# Patient Record
Sex: MALE | Race: WHITE | NOT HISPANIC OR LATINO | Employment: OTHER | URBAN - METROPOLITAN AREA
[De-identification: names, ages, dates, MRNs, and addresses within clinical notes are randomized per-mention and may not be internally consistent; named-entity substitution may affect disease eponyms.]

---

## 2018-01-01 ENCOUNTER — APPOINTMENT (OUTPATIENT)
Dept: CT IMAGING | Facility: HOSPITAL | Age: 83
End: 2018-01-01

## 2018-01-01 ENCOUNTER — APPOINTMENT (OUTPATIENT)
Dept: GENERAL RADIOLOGY | Facility: HOSPITAL | Age: 83
End: 2018-01-01

## 2018-01-01 ENCOUNTER — HOSPITAL ENCOUNTER (INPATIENT)
Facility: HOSPITAL | Age: 83
LOS: 1 days | End: 2018-12-08
Attending: EMERGENCY MEDICINE | Admitting: INTERNAL MEDICINE

## 2018-01-01 VITALS
TEMPERATURE: 94.3 F | SYSTOLIC BLOOD PRESSURE: 107 MMHG | HEIGHT: 70 IN | DIASTOLIC BLOOD PRESSURE: 50 MMHG | OXYGEN SATURATION: 22 % | WEIGHT: 146 LBS | BODY MASS INDEX: 20.9 KG/M2 | RESPIRATION RATE: 24 BRPM

## 2018-01-01 DIAGNOSIS — S06.5XAA SDH (SUBDURAL HEMATOMA) (HCC): Primary | ICD-10-CM

## 2018-01-01 DIAGNOSIS — R40.2412 GLASGOW COMA SCALE TOTAL SCORE 13-15, AT ARRIVAL TO EMERGENCY DEPARTMENT: ICD-10-CM

## 2018-01-01 DIAGNOSIS — I60.9 SAH (SUBARACHNOID HEMORRHAGE) (HCC): ICD-10-CM

## 2018-01-01 DIAGNOSIS — G93.5 BRAIN HERNIATION (HCC): ICD-10-CM

## 2018-01-01 DIAGNOSIS — S05.12XA PERIORBITAL CONTUSION OF LEFT EYE, INITIAL ENCOUNTER: ICD-10-CM

## 2018-01-01 DIAGNOSIS — S06.310A: ICD-10-CM

## 2018-01-01 LAB
ALBUMIN SERPL-MCNC: 3.95 G/DL (ref 3.2–4.8)
ALBUMIN SERPL-MCNC: 4.1 G/DL (ref 3.2–4.8)
ALBUMIN/GLOB SERPL: 1.4 G/DL (ref 1.5–2.5)
ALBUMIN/GLOB SERPL: 1.4 G/DL (ref 1.5–2.5)
ALP SERPL-CCNC: 92 U/L (ref 25–100)
ALP SERPL-CCNC: 96 U/L (ref 25–100)
ALT SERPL W P-5'-P-CCNC: 16 U/L (ref 7–40)
ALT SERPL W P-5'-P-CCNC: 18 U/L (ref 7–40)
ANION GAP SERPL CALCULATED.3IONS-SCNC: 10 MMOL/L (ref 3–11)
ANION GAP SERPL CALCULATED.3IONS-SCNC: 11 MMOL/L (ref 3–11)
ARTERIAL PATENCY WRIST A: POSITIVE
ARTICHOKE IGE QN: 49 MG/DL (ref 0–130)
AST SERPL-CCNC: 32 U/L (ref 0–33)
AST SERPL-CCNC: 34 U/L (ref 0–33)
ATMOSPHERIC PRESS: ABNORMAL MMHG
BASE EXCESS BLDA CALC-SCNC: -0.1 MMOL/L (ref 0–2)
BASOPHILS # BLD AUTO: 0.01 10*3/MM3 (ref 0–0.2)
BASOPHILS NFR BLD AUTO: 0.1 % (ref 0–1)
BDY SITE: ABNORMAL
BILIRUB SERPL-MCNC: 1.7 MG/DL (ref 0.3–1.2)
BILIRUB SERPL-MCNC: 1.8 MG/DL (ref 0.3–1.2)
BODY TEMPERATURE: 37 C
BUN BLD-MCNC: 18 MG/DL (ref 9–23)
BUN BLD-MCNC: 19 MG/DL (ref 9–23)
BUN/CREAT SERPL: 19.4 (ref 7–25)
BUN/CREAT SERPL: 20 (ref 7–25)
CALCIUM SPEC-SCNC: 9 MG/DL (ref 8.7–10.4)
CALCIUM SPEC-SCNC: 9.2 MG/DL (ref 8.7–10.4)
CHLORIDE SERPL-SCNC: 105 MMOL/L (ref 99–109)
CHLORIDE SERPL-SCNC: 106 MMOL/L (ref 99–109)
CHOLEST SERPL-MCNC: 99 MG/DL (ref 0–200)
CO2 SERPL-SCNC: 22 MMOL/L (ref 20–31)
CO2 SERPL-SCNC: 25 MMOL/L (ref 20–31)
COHGB MFR BLD: 1.4 % (ref 0–2)
CREAT BLD-MCNC: 0.9 MG/DL (ref 0.6–1.3)
CREAT BLD-MCNC: 0.98 MG/DL (ref 0.6–1.3)
DEPRECATED RDW RBC AUTO: 50.1 FL (ref 37–54)
DEPRECATED RDW RBC AUTO: 50.9 FL (ref 37–54)
EOSINOPHIL # BLD AUTO: 0.04 10*3/MM3 (ref 0–0.3)
EOSINOPHIL NFR BLD AUTO: 0.4 % (ref 0–3)
ERYTHROCYTE [DISTWIDTH] IN BLOOD BY AUTOMATED COUNT: 13.5 % (ref 11.3–14.5)
ERYTHROCYTE [DISTWIDTH] IN BLOOD BY AUTOMATED COUNT: 13.6 % (ref 11.3–14.5)
GFR SERPL CREATININE-BSD FRML MDRD: 72 ML/MIN/1.73
GFR SERPL CREATININE-BSD FRML MDRD: 79 ML/MIN/1.73
GLOBULIN UR ELPH-MCNC: 2.8 GM/DL
GLOBULIN UR ELPH-MCNC: 2.9 GM/DL
GLUCOSE BLD-MCNC: 166 MG/DL (ref 70–100)
GLUCOSE BLD-MCNC: 170 MG/DL (ref 70–100)
HBA1C MFR BLD: 6 % (ref 4.8–5.6)
HCO3 BLDA-SCNC: 24 MMOL/L (ref 20–26)
HCT VFR BLD AUTO: 33.5 % (ref 38.9–50.9)
HCT VFR BLD AUTO: 35.8 % (ref 38.9–50.9)
HCT VFR BLD CALC: 34.3 %
HDLC SERPL-MCNC: 31 MG/DL (ref 40–60)
HGB BLD-MCNC: 11 G/DL (ref 13.1–17.5)
HGB BLD-MCNC: 11.3 G/DL (ref 13.1–17.5)
HGB BLDA-MCNC: 11.2 G/DL (ref 13.5–17.5)
HOROWITZ INDEX BLD+IHG-RTO: 40 %
IMM GRANULOCYTES # BLD: 0.03 10*3/MM3 (ref 0–0.03)
IMM GRANULOCYTES NFR BLD: 0.3 % (ref 0–0.6)
INR PPP: 1.31 (ref 0.91–1.09)
INR PPP: 3.46 (ref 0.91–1.09)
LYMPHOCYTES # BLD AUTO: 2.06 10*3/MM3 (ref 0.6–4.8)
LYMPHOCYTES NFR BLD AUTO: 21.7 % (ref 24–44)
MAGNESIUM SERPL-MCNC: 2 MG/DL (ref 1.3–2.7)
MCH RBC QN AUTO: 32.6 PG (ref 27–31)
MCH RBC QN AUTO: 33.5 PG (ref 27–31)
MCHC RBC AUTO-ENTMCNC: 31.6 G/DL (ref 32–36)
MCHC RBC AUTO-ENTMCNC: 32.8 G/DL (ref 32–36)
MCV RBC AUTO: 102.1 FL (ref 80–99)
MCV RBC AUTO: 103.2 FL (ref 80–99)
METHGB BLD QL: 0.7 % (ref 0–1.5)
MODALITY: ABNORMAL
MONOCYTES # BLD AUTO: 0.54 10*3/MM3 (ref 0–1)
MONOCYTES NFR BLD AUTO: 5.7 % (ref 0–12)
NEUTROPHILS # BLD AUTO: 6.85 10*3/MM3 (ref 1.5–8.3)
NEUTROPHILS NFR BLD AUTO: 72.1 % (ref 41–71)
NOTE: ABNORMAL
OXYHGB MFR BLDV: 97.4 % (ref 94–99)
PCO2 BLDA: 36.1 MM HG
PCO2 TEMP ADJ BLD: 36.1 MM HG (ref 35–48)
PEEP RESPIRATORY: 5 CM[H2O]
PH BLDA: 7.43 PH UNITS (ref 7.35–7.45)
PH, TEMP CORRECTED: 7.43 PH UNITS
PLATELET # BLD AUTO: 183 10*3/MM3 (ref 150–450)
PLATELET # BLD AUTO: 191 10*3/MM3 (ref 150–450)
PMV BLD AUTO: 9.4 FL (ref 6–12)
PMV BLD AUTO: 9.6 FL (ref 6–12)
PO2 BLDA: 132 MM HG (ref 83–108)
PO2 TEMP ADJ BLD: 132 MM HG (ref 83–108)
POTASSIUM BLD-SCNC: 3.2 MMOL/L (ref 3.5–5.5)
POTASSIUM BLD-SCNC: 3.4 MMOL/L (ref 3.5–5.5)
PROT SERPL-MCNC: 6.7 G/DL (ref 5.7–8.2)
PROT SERPL-MCNC: 7 G/DL (ref 5.7–8.2)
PROTHROMBIN TIME: 13.8 SECONDS (ref 9.6–11.5)
PROTHROMBIN TIME: 36.3 SECONDS (ref 9.6–11.5)
RBC # BLD AUTO: 3.28 10*6/MM3 (ref 4.2–5.76)
RBC # BLD AUTO: 3.47 10*6/MM3 (ref 4.2–5.76)
SET MECH RESP RATE: 14
SODIUM BLD-SCNC: 139 MMOL/L (ref 132–146)
SODIUM BLD-SCNC: 140 MMOL/L (ref 132–146)
TOTAL RATE: 14 BREATHS/MINUTE
TRIGL SERPL-MCNC: 64 MG/DL (ref 0–150)
TROPONIN I SERPL-MCNC: 0 NG/ML (ref 0–0.07)
VT ON VENT VENT: 520 ML
WBC NRBC COR # BLD: 15.35 10*3/MM3 (ref 3.5–10.8)
WBC NRBC COR # BLD: 9.5 10*3/MM3 (ref 3.5–10.8)

## 2018-01-01 PROCEDURE — 99291 CRITICAL CARE FIRST HOUR: CPT | Performed by: INTERNAL MEDICINE

## 2018-01-01 PROCEDURE — 85610 PROTHROMBIN TIME: CPT | Performed by: INTERNAL MEDICINE

## 2018-01-01 PROCEDURE — 70450 CT HEAD/BRAIN W/O DYE: CPT

## 2018-01-01 PROCEDURE — 83735 ASSAY OF MAGNESIUM: CPT | Performed by: EMERGENCY MEDICINE

## 2018-01-01 PROCEDURE — 80053 COMPREHEN METABOLIC PANEL: CPT | Performed by: EMERGENCY MEDICINE

## 2018-01-01 PROCEDURE — 85610 PROTHROMBIN TIME: CPT | Performed by: EMERGENCY MEDICINE

## 2018-01-01 PROCEDURE — 94799 UNLISTED PULMONARY SVC/PX: CPT

## 2018-01-01 PROCEDURE — 99285 EMERGENCY DEPT VISIT HI MDM: CPT

## 2018-01-01 PROCEDURE — 36600 WITHDRAWAL OF ARTERIAL BLOOD: CPT

## 2018-01-01 PROCEDURE — 84484 ASSAY OF TROPONIN QUANT: CPT

## 2018-01-01 PROCEDURE — 25010000002 VITAMIN K1 PER 1 MG: Performed by: INTERNAL MEDICINE

## 2018-01-01 PROCEDURE — 25010000002 PROMETHAZINE PER 50 MG: Performed by: EMERGENCY MEDICINE

## 2018-01-01 PROCEDURE — 93005 ELECTROCARDIOGRAM TRACING: CPT | Performed by: EMERGENCY MEDICINE

## 2018-01-01 PROCEDURE — 83036 HEMOGLOBIN GLYCOSYLATED A1C: CPT | Performed by: INTERNAL MEDICINE

## 2018-01-01 PROCEDURE — 80053 COMPREHEN METABOLIC PANEL: CPT | Performed by: INTERNAL MEDICINE

## 2018-01-01 PROCEDURE — 71045 X-RAY EXAM CHEST 1 VIEW: CPT

## 2018-01-01 PROCEDURE — 31500 INSERT EMERGENCY AIRWAY: CPT

## 2018-01-01 PROCEDURE — 25010000002 PROTHROMBIN COMPLEX CONC HUMAN 1000 UNITS KIT: Performed by: INTERNAL MEDICINE

## 2018-01-01 PROCEDURE — 82805 BLOOD GASES W/O2 SATURATION: CPT

## 2018-01-01 PROCEDURE — 25010000002 PROPOFOL 1000 MG/ML EMULSION: Performed by: EMERGENCY MEDICINE

## 2018-01-01 PROCEDURE — 5A1935Z RESPIRATORY VENTILATION, LESS THAN 24 CONSECUTIVE HOURS: ICD-10-PCS | Performed by: INTERNAL MEDICINE

## 2018-01-01 PROCEDURE — 80061 LIPID PANEL: CPT | Performed by: INTERNAL MEDICINE

## 2018-01-01 PROCEDURE — 70486 CT MAXILLOFACIAL W/O DYE: CPT

## 2018-01-01 PROCEDURE — 85025 COMPLETE CBC W/AUTO DIFF WBC: CPT | Performed by: EMERGENCY MEDICINE

## 2018-01-01 PROCEDURE — 85027 COMPLETE CBC AUTOMATED: CPT | Performed by: INTERNAL MEDICINE

## 2018-01-01 PROCEDURE — 0BH17EZ INSERTION OF ENDOTRACHEAL AIRWAY INTO TRACHEA, VIA NATURAL OR ARTIFICIAL OPENING: ICD-10-PCS | Performed by: EMERGENCY MEDICINE

## 2018-01-01 PROCEDURE — C9132 KCENTRA, PER I.U.: HCPCS | Performed by: INTERNAL MEDICINE

## 2018-01-01 PROCEDURE — 94002 VENT MGMT INPAT INIT DAY: CPT

## 2018-01-01 PROCEDURE — 72125 CT NECK SPINE W/O DYE: CPT

## 2018-01-01 RX ORDER — WARFARIN SODIUM 2.5 MG/1
5 TABLET ORAL NIGHTLY
COMMUNITY

## 2018-01-01 RX ORDER — ERGOCALCIFEROL (VITAMIN D2) 10 MCG
400 TABLET ORAL DAILY
COMMUNITY

## 2018-01-01 RX ORDER — SODIUM CHLORIDE 9 MG/ML
50 INJECTION, SOLUTION INTRAVENOUS CONTINUOUS
Status: DISCONTINUED | OUTPATIENT
Start: 2018-01-01 | End: 2018-01-01

## 2018-01-01 RX ORDER — ETOMIDATE 2 MG/ML
0.3 INJECTION INTRAVENOUS ONCE
Status: COMPLETED | OUTPATIENT
Start: 2018-01-01 | End: 2018-01-01

## 2018-01-01 RX ORDER — FAMOTIDINE 10 MG/ML
20 INJECTION, SOLUTION INTRAVENOUS DAILY
Status: DISCONTINUED | OUTPATIENT
Start: 2018-01-01 | End: 2018-01-01

## 2018-01-01 RX ORDER — CHLORHEXIDINE GLUCONATE 0.12 MG/ML
15 RINSE ORAL EVERY 12 HOURS SCHEDULED
Status: DISCONTINUED | OUTPATIENT
Start: 2018-01-01 | End: 2018-01-01 | Stop reason: HOSPADM

## 2018-01-01 RX ORDER — PHENYLEPHRINE HCL IN 0.9% NACL 0.5 MG/5ML
.5-3 SYRINGE (ML) INTRAVENOUS
Status: DISCONTINUED | OUTPATIENT
Start: 2018-01-01 | End: 2018-01-01

## 2018-01-01 RX ORDER — LANOLIN ALCOHOL/MO/W.PET/CERES
1000 CREAM (GRAM) TOPICAL DAILY
COMMUNITY

## 2018-01-01 RX ORDER — MORPHINE SULFATE 1 MG/ML
2-30 INJECTION INTRAVENOUS
Status: DISCONTINUED | OUTPATIENT
Start: 2018-01-01 | End: 2018-01-01 | Stop reason: HOSPADM

## 2018-01-01 RX ORDER — PROMETHAZINE HYDROCHLORIDE 25 MG/ML
12.5 INJECTION, SOLUTION INTRAMUSCULAR; INTRAVENOUS ONCE
Status: COMPLETED | OUTPATIENT
Start: 2018-01-01 | End: 2018-01-01

## 2018-01-01 RX ORDER — ATENOLOL 25 MG/1
25 TABLET ORAL 2 TIMES DAILY
COMMUNITY

## 2018-01-01 RX ORDER — ATORVASTATIN CALCIUM 10 MG/1
10 TABLET, FILM COATED ORAL NIGHTLY
COMMUNITY

## 2018-01-01 RX ORDER — LORAZEPAM 2 MG/ML
1 INJECTION INTRAMUSCULAR EVERY 6 HOURS PRN
Status: DISCONTINUED | OUTPATIENT
Start: 2018-01-01 | End: 2018-01-01 | Stop reason: HOSPADM

## 2018-01-01 RX ORDER — SODIUM CHLORIDE 0.9 % (FLUSH) 0.9 %
3-10 SYRINGE (ML) INJECTION AS NEEDED
Status: DISCONTINUED | OUTPATIENT
Start: 2018-01-01 | End: 2018-01-01

## 2018-01-01 RX ORDER — CITALOPRAM 10 MG/1
10 TABLET ORAL NIGHTLY
COMMUNITY

## 2018-01-01 RX ORDER — ROCURONIUM BROMIDE 10 MG/ML
100 INJECTION, SOLUTION INTRAVENOUS ONCE
Status: COMPLETED | OUTPATIENT
Start: 2018-01-01 | End: 2018-01-01

## 2018-01-01 RX ORDER — SODIUM CHLORIDE 0.9 % (FLUSH) 0.9 %
3 SYRINGE (ML) INJECTION EVERY 12 HOURS SCHEDULED
Status: DISCONTINUED | OUTPATIENT
Start: 2018-01-01 | End: 2018-01-01

## 2018-01-01 RX ADMIN — PROTHROMBIN, COAGULATION FACTOR VII HUMAN, COAGULATION FACTOR IX HUMAN, COAGULATION FACTOR X HUMAN, PROTEIN C, PROTEIN S HUMAN, AND WATER 2000 UNITS: KIT at 02:06

## 2018-01-01 RX ADMIN — PROPOFOL 5 MCG/KG/MIN: 10 INJECTION, EMULSION INTRAVENOUS at 02:09

## 2018-01-01 RX ADMIN — PHYTONADIONE 10 MG: 10 INJECTION, EMULSION INTRAMUSCULAR; INTRAVENOUS; SUBCUTANEOUS at 02:18

## 2018-01-01 RX ADMIN — PROMETHAZINE HYDROCHLORIDE 12.5 MG: 25 INJECTION INTRAMUSCULAR; INTRAVENOUS at 01:31

## 2018-01-01 RX ADMIN — ROCURONIUM BROMIDE 100 MG: 10 INJECTION INTRAVENOUS at 01:59

## 2018-01-01 RX ADMIN — Medication 10 ML: at 03:23

## 2018-01-01 RX ADMIN — ETOMIDATE 20 MG: 2 INJECTION INTRAVENOUS at 01:59

## 2018-12-08 PROBLEM — I48.91 ATRIAL FIBRILLATION (HCC): Status: ACTIVE | Noted: 2018-01-01

## 2018-12-08 PROBLEM — S06.5XAA SDH (SUBDURAL HEMATOMA) (HCC): Status: ACTIVE | Noted: 2018-01-01

## 2018-12-08 PROBLEM — Z79.01 CHRONIC ANTICOAGULATION: Status: ACTIVE | Noted: 2018-01-01

## 2018-12-08 PROBLEM — S06.2X0A CONTUSION OF BRAIN WITHOUT LOSS OF CONSCIOUSNESS (HCC): Status: ACTIVE | Noted: 2018-01-01

## 2018-12-08 NOTE — SIGNIFICANT NOTE
Exam confirms with auscultation zero audible heart tones and zero audible respirations. Mr.William DRE Hunter was pronounced dead at 0426 12/8/18.  MD notified by Patient's RN.    Mahesh Fernando RN  Clinical House Supervisor  12/8/2018 4:41 AM

## 2018-12-08 NOTE — DISCHARGE SUMMARY
Death Summary         Patient Name: Angel Hunter    CSN: [id number]    MRN: [id number]    : 1926    Today's Date: 18    Date of Admission: 2018    Date/Time of Death: 2018 at 0426    Admitting Physician:  Jimena Garcia MD    Primary Care Provider: Moe Wolf MD    Consultations:   Jose Juan Spann MD, Neurosurgery      Admission Diagnosis: Subdural hematoma    Diagnoses at Time of Death:     SDH (subdural hematoma) (CMS/HCC)    Contusion of brain without loss of consciousness (CMS/HCC)    Chronic anticoagulation    Atrial fibrillation (CMS/HCC)        Procedures: None           HPI     History of Present Illness:    Patient is a 89 y.o. male with a history of atrial fibrillation on Coumadin who fell getting out of the car at 11 PM. He landed on his face. He got up and went to his apartment. His daughter came over and they called an ambulance and he was brought to the hospital. In the emergency room initially he was completely oriented without any focal neurologic deficits. [place] Coma Scale was 15. He had bruising on his face. CT scan revealed a 14 mm subdural hematoma, left frontal lobe hemorrhagic contusion with subarachnoid hemorrhage that was small. There was 8 mm right to left midline shift. Emergency room physician Dr. Wolff spoke with neurosurgery Dr. Spann who asked him to call us for to admit. Upon my arrival the patient had started vomiting and had become unresponsive and was posturing. They were preparing to intubate. Nursing was suctioning him and he did have a gag reflex. His pupils were small and equal. Laboratory data was still pending, however, I asked them to give Kcentra and vitamin K urgently. Subsequently, pro time INR returned at 3.46. Family reports that the patient was completely independent living in an independent senior apartment.          Hospital Course       Hospital Course:    Repeat CT scan of the head showed increase size of  subdural hemorrhage to 32 mm and 24 mm midline shift with subfalcine herniation. He was brought to the ICU and discussions were held with the family who made the decision to withdraw care and make the patient comfortable.  was called for family support and the patient was pronounced  at 0426 on 2018.         KANU Barboza  Pulmonary & Critical Care Medicine                    *Please note that portions of this note were completed with a voice recognition program. Efforts were made to edit the dictations, but occasionally words are mistranscribed.

## 2018-12-08 NOTE — SIGNIFICANT NOTE
After all family members arrived, I spoke with them again and discussed the grave nature of the brain injury. The family has chosen to make the patient comfort measures and withdraw care. He was extubated at 0400. The  was called to be with the family.     KANU Barboza, ACNP-BC  Pulmonary & Critical Care

## 2018-12-08 NOTE — PROGRESS NOTES
89 yo man on coumadin [facility] with R SDH. Decompensated in ED, now intubated.    He is not a surgical candidate because of his advanced age. Unfortunately, based on his present clinical status, his prognosis is extremely poor. I suspect that this is a terminal event.

## 2018-12-08 NOTE — H&P
ICU ADMISSION NOTE    Chief complaint fall, SDH, brain contusion    Subjective     Patient is a 89 y.o. male with a history of atrial fibrillation on Coumadin who fell getting out of the car at 11 PM. He landed on his face. He got up and went to his apartment. His daughter came over and they called an ambulance and he was brought to the hospital. In the emergency room initially he was completely oriented without any focal neurologic deficits. [place] Coma Scale was 15. He had bruising on his face. CT scan revealed a 14 mm subdural hematoma, left frontal lobe hemorrhagic contusion with subarachnoid hemorrhage that was small. There was 8 mm right to left midline shift. Emergency room physician Dr. Wolff spoke with neurosurgery Dr. Spann who asked him to call us for to admit. Upon my arrival the patient had started vomiting and had to come unresponsive and was posturing. They were preparing to intubate. Nursing was suctioning him and he did have a gag reflex. His pupils were small and equal. Laboratory data was still pending, however, I asked them to give Kcentra and vitamin K urgently. Subsequently, pro time INR returned at 3.46. Family reports that the patient was completely independent living in an independent senior apartment.    Review of Systems  Review of Systems   Reason unable to perform ROS: Intubated.        Home Medications    (Not in a hospital admission)  Atenolol 25 mg twice daily  Lipitor 10 mg daily  Lumigan eyedrops  Vitamin D 3 400 international units  Vitamin B12 thousand micrograms  Coumadin    History  Past Medical History:   Diagnosis Date   • A-fib (CMS/HCC)    • Depression    • Dysphagia    • Glaucoma    • Hyperlipidemia    • Pneumonia 2013   • Sleep apnea     Central and obstructive     Past Surgical History:   Procedure Laterality Date   • HERNIA REPAIR     • TONSILLECTOMY       History reviewed. No pertinent family history.  Social History     Tobacco Use   • Smoking status: Former  "Smoker     Years: 20.00     Types: Cigars   • Smokeless tobacco: Never Used   Substance Use Topics   • Alcohol use: Not on file   • Drug use: Not on file       (Not in a hospital admission)  Allergies:  Clindamycin/lincomycin and Penicillins      Objective     Vital Signs  Blood pressure 140/91, pulse 112, temperature 96.7 °F (35.9 °C), temperature source Axillary, resp. rate 24, height 177.8 cm (70\"), weight 66.2 kg (146 lb), SpO2 99 %.    Physical Exam:  General Appearance:  Elderly white male intubated, sedated    Head:  Ecchymosis over his left eye and cheek    Eyes:          Pinpoint and equal pupils    Ears:     Throat: Orally intubated    Neck: Trachea midline, no JVD, no palpable thyroid    Back:      Lungs:   Symmetric chest expansion, breath sounds bilateral without wheeze or rhonchi     Heart:  Irregular rhythm, S1, S2 auscultated    Abdomen:   Bowel sounds are diminished but present, flat, nondistended    Rectal:     Deferred   Extremities:    No pitting edema, nail beds pink    Pulses:   Pedal pulses present    Skin: Warm and dry    Lymph nodes: No cervical adenopathy    Neurologic:   Currently sedated and unable to assess        Results Review:   Lab Results (last 24 hours)     Procedure Component Value Units Date/Time    Protime-INR [[id number]]  (Abnormal) Collected:  12/08/18 0128    Specimen:  Blood Updated:  12/08/18 0211     Protime 36.3 Seconds      INR 3.46    Comprehensive Metabolic Panel [[id number]]  (Abnormal) Collected:  12/08/18 0128    Specimen:  Blood Updated:  12/08/18 0158     Glucose 166 mg/dL      BUN 19 mg/dL      Creatinine 0.98 mg/dL      Sodium 140 mmol/L      Potassium 3.4 mmol/L      Chloride 105 mmol/L      CO2 25.0 mmol/L      Calcium 9.2 mg/dL      Total Protein 7.0 g/dL      Albumin 4.10 g/dL      ALT (SGPT) 18 U/L      AST (SGOT) 32 U/L      Alkaline Phosphatase 96 U/L      Total Bilirubin 1.7 mg/dL      eGFR Non African Amer 72 mL/min/1.73      Globulin 2.9 gm/dL      A/G " Ratio 1.4 g/dL      BUN/Creatinine Ratio 19.4     Anion Gap 10.0 mmol/L     Narrative:       [employer] Guidelines    Stage     Description        GFR  1         Normal or High     90+  2         Mild decrease      60-89  3         Moderate decrease  30-59  4         Severe decrease    15-29  5         Kidney failure     <15    The MDRD GFR formula is only valid for adults with stable renal function between ages 18 and 70.    Magnesium [[id number]]  (Normal) Collected:  12/08/18 0128    Specimen:  Blood Updated:  12/08/18 0158     Magnesium 2.0 mg/dL     CBC & Differential [[id number]] Collected:  12/08/18 0128    Specimen:  Blood Updated:  12/08/18 0152    Narrative:       The following orders were created for panel order CBC & Differential.  Procedure                               Abnormality         Status                     ---------                               -----------         ------                     CBC Auto Differential[[id number]]         Abnormal            Final result                 Please view results for these tests on the individual orders.    CBC Auto Differential [[id number]]  (Abnormal) Collected:  12/08/18 0128    Specimen:  Blood Updated:  12/08/18 0152     WBC 9.50 10*3/mm3      RBC 3.47 10*6/mm3      Hemoglobin 11.3 g/dL      Hematocrit 35.8 %      .2 fL      MCH 32.6 pg      MCHC 31.6 g/dL      RDW 13.6 %      RDW-SD 50.9 fl      MPV 9.4 fL      Platelets 191 10*3/mm3      Neutrophil % 72.1 %      Lymphocyte % 21.7 %      Monocyte % 5.7 %      Eosinophil % 0.4 %      Basophil % 0.1 %      Immature Grans % 0.3 %      Neutrophils, Absolute 6.85 10*3/mm3      Lymphocytes, Absolute 2.06 10*3/mm3      Monocytes, Absolute 0.54 10*3/mm3      Eosinophils, Absolute 0.04 10*3/mm3      Basophils, Absolute 0.01 10*3/mm3      Immature Grans, Absolute 0.03 10*3/mm3     POC Troponin, Rapid [[id number]]  (Normal) Collected:  12/08/18 0138    Specimen:  Blood Updated:  12/08/18 0150      Troponin I 0.00 ng/mL      Comment: Serial Number: [id number]:  [id number]           Imaging Results (last 24 hours)     Procedure Component Value Units Date/Time    XR Chest 1 View [[id number]] Updated:  12/08/18 0219    XR Chest 1 View [[id number]] Collected:  12/08/18 0132     Updated:  12/08/18 0210    Narrative:       EXAM:    XR Chest, 1 View     EXAM DATE/TIME:    12/8/2018 1:32 AM     CLINICAL HISTORY:    89 years old, male; Pain; Other: Brain bleed from fall; Patient HX: Patient   fell tonight and hit head. CT confirmed patient has a brain bleed. Patient is   having symptoms of nausea and vomiting currently. HX of aspirations.     TECHNIQUE:    XR of the chest, 1 view.     COMPARISON:    No relevant prior studies available.     FINDINGS:    Tubes, catheters and devices:  Monitor leads project over the chest.    Lungs:  Minimal right basilar atelectasis versus pneumonia. Mild biapical   scarring.    Pleural space:  No significant costophrenic angle blunting. No pneumothorax.    Heart/Mediastinum:  Heart size appears prominent but is likely exaggerated by   the portable AP technique.    Vasculature:  Atherosclerotic tortuosity of the thoracic aorta.    Bones/joints:  Bony demineralization and degenerative bony changes.       Impression:       Minimal right basilar atelectasis versus pneumonia.     THIS DOCUMENT HAS BEEN ELECTRONICALLY SIGNED BY JIMBO WELSH JR. MD    CT Facial Bones Without Contrast [[id number]] Collected:  12/08/18 0107     Updated:  12/08/18 0135    Narrative:       EXAM:    CT Maxillofacial Without Contrast     EXAM DATE/TIME:    12/8/2018 1:07 AM     CLINICAL HISTORY:    89 years old, male; Injury or trauma; Fall; Initial encounter; Blunt trauma   (contusions or hematomas); Orbit/periorbital; Left; Additional info: Facial   fracture(s)     TECHNIQUE:    Axial computed tomography images of the face without intravenous contrast.    All CT scans at this facility use at least one of these dose  optimization   techniques: automated exposure control; mA and/or kV adjustment per patient   size (includes targeted exams where dose is matched to clinical indication); or   iterative reconstruction.    Coronal and sagittal reformatted images were created and reviewed.     COMPARISON:    CT FACIAL BONES WO CONTRAST 7/19/2015 3:25 PM     FINDINGS:    Bones/joints: No acute osseous abnormality. No acute fracture. 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   Soft tissues:  Left periorbital soft tissue swelling.    Orbits: Bony orbits, globes and extra ocular structures appear intact.    Sinuses: No significant or acute abnormality. No air-fluid levels.    Brain: Intracranial findings reported separately. Please see earlier head CT   report.      Impression:       No acute fracture demonstrated.     THIS DOCUMENT HAS BEEN ELECTRONICALLY SIGNED BY JIMBO WELSH JR. MD    CT Cervical Spine Without Contrast [[id number]] Collected:  12/08/18 0107     Updated:  12/08/18 0131    Narrative:       EXAM:    CT Cervical Spine Without Contrast     EXAM DATE/TIME:    12/8/2018 1:07 AM     CLINICAL HISTORY:    89 years old, male; Injury or trauma; Fall; Initial encounter; Blunt trauma;   Additional info: Spine fracture, traumatic, cervical     TECHNIQUE:    Axial computed tomography images of the cervical spine without intravenous   contrast.    All CT scans at this facility use at least one of these dose optimization   techniques: automated exposure control; mA and/or kV adjustment per patient   size (includes targeted exams where dose is matched to clinical indication); or   iterative reconstruction.    Coronal and sagittal reformatted images were created and reviewed.     COMPARISON:    No relevant prior studies available.     FINDINGS:    Vertebrae: Diffuse bony demineralization and mild degenerative changes. No   acute fracture. Normal alignment and vertebral body height.    Discs/Spinal canal/Neural foramina: No acute  findings. No significant spinal   stenosis.    Soft tissues: No significant soft tissue abnormalities.    Lungs:  Biapical scarring.       Impression:       No evidence of acute fracture or subluxation.     THIS DOCUMENT HAS BEEN ELECTRONICALLY SIGNED BY JIMBO WELSH JR. MD    CT Head Without Contrast [[id number]] Collected:  12/08/18 0101     Updated:  12/08/18 0120    Narrative:       EXAM:    CT Head Without Contrast     EXAM DATE/TIME:    12/8/2018 1:01 AM     CLINICAL HISTORY:    89 years old, male; Injury or trauma; Fall; Initial encounter; Blunt trauma   (contusions or hematomas); With loss of consciousness; Additional info: Head   trauma, headache     TECHNIQUE:    Axial computed tomography images of the head/brain without contrast.    All CT scans at this facility use at least one of these dose optimization   techniques: automated exposure control; mA and/or kV adjustment per patient   size (includes targeted exams where dose is matched to clinical indication); or   iterative reconstruction.     COMPARISON:    CT HEAD WO CONTRAST 7/19/2015 3:25 PM     FINDINGS:    Brain:  Diffuse right convexity acute subdural hematoma measuring up to 14 mm   in thickness. 1.5 x 1.1 x 1.5 cm, approximately 1.2 cc, anterior inferior left   frontal lobe hemorrhagic contusion and minimal subarachnoid hemorrhage. Right   hemispheric sulcal effacement and 8 mm of right-to-left midline shift.   Prominence of the ventricles and sulci consistent with diffuse atrophy.   Periventricular and subcortical white matter low-attenuation consistent with   chronic small vessel ischemic changes.    Ventricles:  Partial effacement of the right lateral ventricle. No   hydrocephalus.    Bones/joints: No acute osseous abnormality. No acute fracture.    Sinuses: No significant or acute abnormality. No air-fluid levels.    Mastoid air cells: No acute abnormality. No significant mastoid effusion.    Soft tissues:  Left periorbital soft tissue swelling.        Impression:       1. Diffuse right convexity acute subdural hematoma measuring up to 14 mm in   thickness.   2. 1.5 x 1.1 x 1.5 cm, approximately 1.2 cc, anterior inferior left frontal   lobe hemorrhagic contusion and minimal subarachnoid hemorrhage.   3. Right hemispheric sulcal effacement and 8 mm of right-to-left midline shift.   4. Diffuse atrophy and white matter chronic small vessel ischemic changes.     THIS REPORT CONTAINS FINDINGS THAT MAY BE CRITICAL TO PATIENT CARE. The   findings were verbally communicated via telephone conference with DARWIN Catalan at 1:12 AM EST on 12/8/2018. The findings were acknowledged and   understood.    THIS DOCUMENT HAS BEEN ELECTRONICALLY SIGNED BY JIMBO WELSH JR. MD           PROBLEM LIST  Patient Active Problem List   Diagnosis   • SDH (subdural hematoma) (CMS/HCC)   • Contusion of brain without loss of consciousness (CMS/HCC)   • Chronic anticoagulation   • Atrial fibrillation (CMS/HCC)       Assessment/Plan    89-year-old gentleman with atrial fibrillation on Coumadin, some dyslipidemia but living independently in a senior apartment. He fell getting out of a chair struck his left face. On arrival to the emergency room [place] Coma Scale was 15. CT scan revealed a subdural hematoma and left frontal contusion with small amount of subarachnoid blood. He had a dramatic decline in function becoming decerebrate, vomiting, becoming unresponsive with [place] Coma Scale of 4. He was sedated and intubated by the emergency room physician. He had not yet received reversal of his Coumadin. Postintubation, he received Kcentra and vitamin K.    Repeat CT scan of the head  Maintain mechanical ventilation  Recheck coagulation studies  Neurosurgery evaluation  SCDS  propofol    Jimena Garcia MD  12/08/18  2:31 AM    Time: 55min    This note was produced with a voice recognition program and may have uncorrected errors.

## 2018-12-08 NOTE — SIGNIFICANT NOTE
Repeat CT scan showed significantly more blood and edema with a 23 mm midline shift compared to 8 mm on his initial scan. Neurosurgery evaluated his scans and feel this is catastrophic and unsurvivable. Because of his advanced age they do not feel he is a surgical candidate. I spoke to his 2 daughters about the grave findings on his CT scan. I will change his CODE STATUS to no resuscitation. They would like further Brother to have an opportunity to come to the hospital. Once they are Brother has arrived they will make a decision concerning maintaining current support or withdrawal of support and comfort measures.

## 2018-12-08 NOTE — SIGNIFICANT NOTE
Pt gold-colored ring removed and sent ome with daughters, son, per their request. Bag of clothing, shoes, which came up with pt from ED sent home with them also

## 2018-12-08 NOTE — ED PROVIDER NOTES
Subjective   89-year-old male presents via EMS for evaluation of head injury and nausea/vomiting.  According to both the patient and EMS personnel, approximately one hour ago the patient was stepping out of a car when he tripped and hit his head on the ground.  No LOC.  He was lucid after the injury but approximately 30 minutes ago began experiencing nausea and vomiting.  He was subsequently brought to our facility for further evaluation and treatment.  Currently, the patient's only complaint is mild headache as well as nausea and vomiting.  He denies any abdominal pain.  He denies any sick contacts or recent diet changes.  Per EMS, he is not anticoagulated.        History provided by:  EMS personnel  Fall   Mechanism of injury: fall    Injury location:  Head/neck  Head/neck injury location:  Head  Incident location:  Home  Time since incident:  1 hour  Arrived directly from scene: yes    Fall:     Fall occurred:  Tripped    Impact surface:  [place]    Point of impact:  Head  Prior to arrival data:     Patient ambulatory at scene: yes      Amnesic to event: yes    Associated symptoms: headaches, nausea, neck pain (with movement ) and vomiting    Risk factors: anticoagulation therapy (Warfarin)        Review of Systems   Eyes:        Bruising L orbital   Gastrointestinal: Positive for nausea and vomiting.   Musculoskeletal: Positive for neck pain (with movement ).   Neurological: Positive for headaches.   All other systems reviewed and are negative.      Past Medical History:   Diagnosis Date   • A-fib (CMS/HCC)    • Depression    • Dysphagia    • Glaucoma    • Hyperlipidemia    • Pneumonia 2013   • Sleep apnea     Central and obstructive       Allergies   Allergen Reactions   • Clindamycin/Lincomycin Itching   • Penicillins Rash     FROM CHILDHOOD       Past Surgical History:   Procedure Laterality Date   • HERNIA REPAIR     • TONSILLECTOMY         History reviewed. No pertinent family history.    Social History      Socioeconomic History   • Marital status:      Spouse name: Not on file   • Number of children: 3   • Years of education: Not on file   • Highest education level: Not on file   Tobacco Use   • Smoking status: Former Smoker     Years: 20.00     Types: Cigars   • Smokeless tobacco: Never Used   Social History Narrative    Retired banker.  Lives in a senior apartment.  Independent         Objective   Physical Exam   Constitutional: He is oriented to person, place, and time. He appears well-developed and well-nourished. No distress.   Nontoxic appearing male in no acute distress   HENT:   Head: Normocephalic.   Mouth/Throat: Oropharynx is clear and moist.   Left periorbital contusion   Neck:   C collar placed   Cardiovascular: Normal rate, regular rhythm and normal heart sounds. Exam reveals no gallop and no friction rub.   No murmur heard.  Pulmonary/Chest: Effort normal and breath sounds normal. No respiratory distress. He has no wheezes. He has no rales.   Abdominal: Soft. Bowel sounds are normal. He exhibits no distension and no mass. There is no tenderness. There is no guarding.   Musculoskeletal: Normal range of motion.   Neurological: He is alert and oriented to person, place, and time. No cranial nerve deficit or sensory deficit. Coordination normal.   GCS of 15, awake, alert, and oriented ×3, following commands in all fours, neurovascularly intact distally in all fours, 5 out of 5 strength in all fours   Skin: Skin is warm and dry. No rash noted. He is not diaphoretic. No erythema. No pallor.   Psychiatric: He has a normal mood and affect. Judgment and thought content normal.   Nursing note and vitals reviewed.      Intubation  Date/Time: 12/8/2018 1:54 AM  Performed by: Matt Wolff MD  Authorized by: Matt Wolff MD     Consent:     Consent obtained:  Emergent situation    Consent given by:  Spouse    Risks discussed:  Brain injury  Pre-procedure details:     Patient status:   Altered mental status    Mallampati score:  I    Pretreatment meds: etomidate.    Paralytics:  Rocuronium  Procedure details:     Preoxygenation:  Nasal cannula    CPR in progress: no      Intubation method:  Oral    Oral intubation technique:  Video-assisted    Laryngoscope blade:  Mac 4    Tube size (mm):  7.5    Tube type:  Cuffed    Number of attempts:  1    Ventilation between attempts: no      Cricoid pressure: no      Tube visualized through cords: yes    Placement assessment:     ETT to teeth:  22    Tube secured with:  ETT judd    Breath sounds:  Equal    Placement verification: chest rise, condensation, CXR verification, direct visualization, equal breath sounds, ETCO2 detector and tube exhalation      CXR findings:  ETT in proper place  Post-procedure details:     Patient tolerance of procedure:  Tolerated well, no immediate complications  Critical Care  Performed by: Matt Wolff MD  Authorized by: Matt Wolff MD     Critical care provider statement:     Critical care time (minutes):  80    Critical care time was exclusive of:  Separately billable procedures and treating other patients    Critical care was necessary to treat or prevent imminent or life-threatening deterioration of the following conditions:  CNS failure or compromise and trauma    Critical care was time spent personally by me on the following activities:  Evaluation of patient's response to treatment, examination of patient, obtaining history from patient or surrogate, ordering and performing treatments and interventions, ordering and review of laboratory studies, ordering and review of radiographic studies, re-evaluation of patient's condition, development of treatment plan with patient or surrogate, pulse oximetry, discussions with consultants and ventilator management             ED Course  ED Course as of Dec 08 0719   Sat Dec 08, 2018   0112 Dr. Wolff receives CT reports from [facility].   [AT]   0115 ICH score = 1  [AT]    0116 Dr. New Spann, on-call neurosurgeon  [AT]   0124 Dr. Wolff pageludivina Garcia, intensivist   [AT]   0126 Family arrives to ED and reports pt is on chronic-anticoagulation with Warfarin secondary to A-fib. Pt is not always compliant with this medication.   [AT]   0134 Dr. Wolff discusses case in detail with Dr. Spann, who will accept   [AT]   0149 Upon re-evaluation, pt's mental status is declining. Dr. Wolff recommends intubation to family. Family agrees.   [AT]   0158 Dr. Wolff at bedside performing intubation   [AT]   0200 Dr. Wolff pageludivina Spann  [AT]   0202 ICH score re-evaluated. Saranya Coma score is now 5. ICH score = 2  [AT]   0207 Dr. Wolff discussed pt's change in mental status with Dr. Spann.   [AT]   0243 Dr. Wolff receives report from [facility] regarding new CT scan results.   [AT]   0712 89-year-old male presents via EMS for evaluation of head injury and nausea/vomiting.  Approximately one hour prior to coming to the emergency department, the patient had a mechanical trip and fall attempting to get out of a car and hit his head on the ground.  No LOC.  He was lucid after the injury and about 30 minutes afterward began experiencing nausea/vomiting that prompted his visit to the ED.  of note, I was told by EMS that the patient was not anticoagulated.  On arrival, patient nontoxic appearing.  Exam remarkable only for left periorbital contusion.  GCS of 15.  No focal neurological deficits noted.  The patient's only complaints are mild headache and neck pain as well as nausea and vomiting.  Unable to clear his C-spine by NEXUS.  C collar placed.  The patient was taken directly to the CT scanner where a head CT was performed and revealed a right-sided subdural hematoma with 8 mm of right to left midline shift as well as a left frontal contusion with traumatic subarachnoid hemorrhage.  ICH score of 1.  CT face negative.  CT cervical spine negative.  C-collar cleared.  The patient's blood  pressure was less than 160 systolic.  Head of bed at 30°.  I discussed his case with Dr. Spann of neurosurgery who recommended admitting the patient to the ICU.  I discussed the patient's case with Dr. Garcia, and the patient was admitted to the ICU.  While boarding in the emergency department, I was notified by nursing that the patient had an acute change in his mental status.  I went into his room and found him minimally responsive with decorticate posturing and a fixed right pupil.  GCS of 5.  I had along discussion with the patient's family regarding goals of care who advised me that the patient was a full code.  They also notified me that the patient was anticoagulated and on warfarin.  Vitamin K and PCCs given.  [DD]   0715 The patient was intubated without complication on the first attempt.  Propofol given for sedation postintubation.  I notified both Dr. Spann and Dr. Garcia of the patient's change in his clinical status.  Repeat head CT revealed much larger subdural with subfalcine herniation.  The patient was taken to the ICU for further care and potential definitive surgical management.  [DD]   0719 His family is aware of his grave condition.  [DD]      ED Course User Index  [AT] Edward Carvajal  [DD] Matt Wolff MD     Recent Results (from the past 24 hour(s))   Protime-INR    Collection Time: 12/08/18  1:28 AM   Result Value Ref Range    Protime 36.3 (H) 9.6 - 11.5 Seconds    INR 3.46 (H) 0.91 - 1.09   Comprehensive Metabolic Panel    Collection Time: 12/08/18  1:28 AM   Result Value Ref Range    Glucose 166 (H) 70 - 100 mg/dL    BUN 19 9 - 23 mg/dL    Creatinine 0.98 0.60 - 1.30 mg/dL    Sodium 140 132 - 146 mmol/L    Potassium 3.4 (L) 3.5 - 5.5 mmol/L    Chloride 105 99 - 109 mmol/L    CO2 25.0 20.0 - 31.0 mmol/L    Calcium 9.2 8.7 - 10.4 mg/dL    Total Protein 7.0 5.7 - 8.2 g/dL    Albumin 4.10 3.20 - 4.80 g/dL    ALT (SGPT) 18 7 - 40 U/L    AST (SGOT) 32 0 - 33 U/L     Alkaline Phosphatase 96 25 - 100 U/L    Total Bilirubin 1.7 (H) 0.3 - 1.2 mg/dL    eGFR Non African Amer 72 >60 mL/min/1.73    Globulin 2.9 gm/dL    A/G Ratio 1.4 (L) 1.5 - 2.5 g/dL    BUN/Creatinine Ratio 19.4 7.0 - 25.0    Anion Gap 10.0 3.0 - 11.0 mmol/L   Magnesium    Collection Time: 12/08/18  1:28 AM   Result Value Ref Range    Magnesium 2.0 1.3 - 2.7 mg/dL   CBC Auto Differential    Collection Time: 12/08/18  1:28 AM   Result Value Ref Range    WBC 9.50 3.50 - 10.80 10*3/mm3    RBC 3.47 (L) 4.20 - 5.76 10*6/mm3    Hemoglobin 11.3 (L) 13.1 - 17.5 g/dL    Hematocrit 35.8 (L) 38.9 - 50.9 %    .2 (H) 80.0 - 99.0 fL    MCH 32.6 (H) 27.0 - 31.0 pg    MCHC 31.6 (L) 32.0 - 36.0 g/dL    RDW 13.6 11.3 - 14.5 %    RDW-SD 50.9 37.0 - 54.0 fl    MPV 9.4 6.0 - 12.0 fL    Platelets 191 150 - 450 10*3/mm3    Neutrophil % 72.1 (H) 41.0 - 71.0 %    Lymphocyte % 21.7 (L) 24.0 - 44.0 %    Monocyte % 5.7 0.0 - 12.0 %    Eosinophil % 0.4 0.0 - 3.0 %    Basophil % 0.1 0.0 - 1.0 %    Immature Grans % 0.3 0.0 - 0.6 %    Neutrophils, Absolute 6.85 1.50 - 8.30 10*3/mm3    Lymphocytes, Absolute 2.06 0.60 - 4.80 10*3/mm3    Monocytes, Absolute 0.54 0.00 - 1.00 10*3/mm3    Eosinophils, Absolute 0.04 0.00 - 0.30 10*3/mm3    Basophils, Absolute 0.01 0.00 - 0.20 10*3/mm3    Immature Grans, Absolute 0.03 0.00 - 0.03 10*3/mm3   POC Troponin, Rapid    Collection Time: 12/08/18  1:38 AM   Result Value Ref Range    Troponin I 0.00 0.00 - 0.07 ng/mL     Note: In addition to lab results from this visit, the labs listed above may include labs taken at another facility or during a different encounter within the last 24 hours. Please correlate lab times with ED admission and discharge times for further clarification of the services performed during this visit.    XR Chest 1 View   Final Result   Minimal right basilar atelectasis versus pneumonia.       THIS DOCUMENT HAS BEEN ELECTRONICALLY SIGNED BY JIMBO WELSH JR. MD      CT Facial Bones  Without Contrast   Final Result   No acute fracture demonstrated.       THIS DOCUMENT HAS BEEN ELECTRONICALLY SIGNED BY JIMBO WELSH JR. MD      CT Cervical Spine Without Contrast   Final Result   No evidence of acute fracture or subluxation.       THIS DOCUMENT HAS BEEN ELECTRONICALLY SIGNED BY JIMBO WELSH JR. MD      CT Head Without Contrast   Final Result   1. Diffuse right convexity acute subdural hematoma measuring up to 14 mm in    thickness.    2. 1.5 x 1.1 x 1.5 cm, approximately 1.2 cc, anterior inferior left frontal    lobe hemorrhagic contusion and minimal subarachnoid hemorrhage.    3. Right hemispheric sulcal effacement and 8 mm of right-to-left midline shift.    4. Diffuse atrophy and white matter chronic small vessel ischemic changes.       THIS REPORT CONTAINS FINDINGS THAT MAY BE CRITICAL TO PATIENT CARE. The    findings were verbally communicated via telephone conference with DARWIN Catalan at 1:12 AM EST on 12/8/2018. The findings were acknowledged and    understood.      THIS DOCUMENT HAS BEEN ELECTRONICALLY SIGNED BY JIMBO WELSH JR. MD      CT Head Without Contrast    (Results Pending)   XR Chest 1 View    (Results Pending)   CT Head Without Contrast    (Results Pending)   CT Head Without Contrast    (Results Pending)     Vitals:    12/08/18 0159 12/08/18 0210 12/08/18 0214 12/08/18 0215   BP: 145/85 119/71  140/91   BP Location:       Patient Position:       Pulse: (!) 126 108 112    Resp:       Temp:       TempSrc:       SpO2: 94% 100% 99% 99%   Weight:       Height:         Medications   sodium chloride 0.9 % flush 3 mL (not administered)   sodium chloride 0.9 % flush 3-10 mL (not administered)   prothrombin complex conc human (KCentra) IV solution 2,000 Units (2,000 Units Intravenous Given 12/8/18 0206)     And   phytonadione (AQUA-MEPHYTON, VITAMIN K) 10 mg in sodium chloride 0.9 % 50 mL IVPB (10 mg Intravenous New Bag 12/8/18 0218)   niCARdipine (CARDENE-IV) 20 mg/200 mL (0.1 mg/mL) in  0.9% NaCl infusion (not administered)   phenylephrine (JOSE-SYNEPHRINE) 50 mg/250 mL (0.2 mg/mL) in 0.9% NS  infusion (not administered)   propofol (DIPRIVAN) infusion 10 mg/mL 100 mL (5 mcg/kg/min × 66.2 kg Intravenous New Bag 12/8/18 0209)   promethazine (PHENERGAN) injection 12.5 mg (12.5 mg Intravenous Given 12/8/18 0131)   etomidate (AMIDATE) injection 19.86 mg (20 mg Intravenous Given 12/8/18 0159)   rocuronium (ZEMURON) injection 100 mg (100 mg Intravenous Given 12/8/18 0159)     ECG/EMG Results (last 24 hours)     ** No results found for the last 24 hours. **        Recent Results (from the past 24 hour(s))   Protime-INR    Collection Time: 12/08/18  1:28 AM   Result Value Ref Range    Protime 36.3 (H) 9.6 - 11.5 Seconds    INR 3.46 (H) 0.91 - 1.09   Comprehensive Metabolic Panel    Collection Time: 12/08/18  1:28 AM   Result Value Ref Range    Glucose 166 (H) 70 - 100 mg/dL    BUN 19 9 - 23 mg/dL    Creatinine 0.98 0.60 - 1.30 mg/dL    Sodium 140 132 - 146 mmol/L    Potassium 3.4 (L) 3.5 - 5.5 mmol/L    Chloride 105 99 - 109 mmol/L    CO2 25.0 20.0 - 31.0 mmol/L    Calcium 9.2 8.7 - 10.4 mg/dL    Total Protein 7.0 5.7 - 8.2 g/dL    Albumin 4.10 3.20 - 4.80 g/dL    ALT (SGPT) 18 7 - 40 U/L    AST (SGOT) 32 0 - 33 U/L    Alkaline Phosphatase 96 25 - 100 U/L    Total Bilirubin 1.7 (H) 0.3 - 1.2 mg/dL    eGFR Non African Amer 72 >60 mL/min/1.73    Globulin 2.9 gm/dL    A/G Ratio 1.4 (L) 1.5 - 2.5 g/dL    BUN/Creatinine Ratio 19.4 7.0 - 25.0    Anion Gap 10.0 3.0 - 11.0 mmol/L   Magnesium    Collection Time: 12/08/18  1:28 AM   Result Value Ref Range    Magnesium 2.0 1.3 - 2.7 mg/dL   CBC Auto Differential    Collection Time: 12/08/18  1:28 AM   Result Value Ref Range    WBC 9.50 3.50 - 10.80 10*3/mm3    RBC 3.47 (L) 4.20 - 5.76 10*6/mm3    Hemoglobin 11.3 (L) 13.1 - 17.5 g/dL    Hematocrit 35.8 (L) 38.9 - 50.9 %    .2 (H) 80.0 - 99.0 fL    MCH 32.6 (H) 27.0 - 31.0 pg    MCHC 31.6 (L) 32.0 - 36.0 g/dL     RDW 13.6 11.3 - 14.5 %    RDW-SD 50.9 37.0 - 54.0 fl    MPV 9.4 6.0 - 12.0 fL    Platelets 191 150 - 450 10*3/mm3    Neutrophil % 72.1 (H) 41.0 - 71.0 %    Lymphocyte % 21.7 (L) 24.0 - 44.0 %    Monocyte % 5.7 0.0 - 12.0 %    Eosinophil % 0.4 0.0 - 3.0 %    Basophil % 0.1 0.0 - 1.0 %    Immature Grans % 0.3 0.0 - 0.6 %    Neutrophils, Absolute 6.85 1.50 - 8.30 10*3/mm3    Lymphocytes, Absolute 2.06 0.60 - 4.80 10*3/mm3    Monocytes, Absolute 0.54 0.00 - 1.00 10*3/mm3    Eosinophils, Absolute 0.04 0.00 - 0.30 10*3/mm3    Basophils, Absolute 0.01 0.00 - 0.20 10*3/mm3    Immature Grans, Absolute 0.03 0.00 - 0.03 10*3/mm3   POC Troponin, Rapid    Collection Time: 12/08/18  1:38 AM   Result Value Ref Range    Troponin I 0.00 0.00 - 0.07 ng/mL   Hemoglobin A1c    Collection Time: 12/08/18  2:53 AM   Result Value Ref Range    Hemoglobin A1C 6.00 (H) 4.80 - 5.60 %   Lipid Panel    Collection Time: 12/08/18  2:53 AM   Result Value Ref Range    Total Cholesterol 99 0 - 200 mg/dL    Triglycerides 64 0 - 150 mg/dL    HDL Cholesterol 31 (L) 40 - 60 mg/dL    LDL Cholesterol  49 0 - 130 mg/dL   CBC (No Diff)    Collection Time: 12/08/18  2:53 AM   Result Value Ref Range    WBC 15.35 (H) 3.50 - 10.80 10*3/mm3    RBC 3.28 (L) 4.20 - 5.76 10*6/mm3    Hemoglobin 11.0 (L) 13.1 - 17.5 g/dL    Hematocrit 33.5 (L) 38.9 - 50.9 %    .1 (H) 80.0 - 99.0 fL    MCH 33.5 (H) 27.0 - 31.0 pg    MCHC 32.8 32.0 - 36.0 g/dL    RDW 13.5 11.3 - 14.5 %    RDW-SD 50.1 37.0 - 54.0 fl    MPV 9.6 6.0 - 12.0 fL    Platelets 183 150 - 450 10*3/mm3   Comprehensive Metabolic Panel    Collection Time: 12/08/18  2:53 AM   Result Value Ref Range    Glucose 170 (H) 70 - 100 mg/dL    BUN 18 9 - 23 mg/dL    Creatinine 0.90 0.60 - 1.30 mg/dL    Sodium 139 132 - 146 mmol/L    Potassium 3.2 (L) 3.5 - 5.5 mmol/L    Chloride 106 99 - 109 mmol/L    CO2 22.0 20.0 - 31.0 mmol/L    Calcium 9.0 8.7 - 10.4 mg/dL    Total Protein 6.7 5.7 - 8.2 g/dL    Albumin 3.95 3.20 -  4.80 g/dL    ALT (SGPT) 16 7 - 40 U/L    AST (SGOT) 34 (H) 0 - 33 U/L    Alkaline Phosphatase 92 25 - 100 U/L    Total Bilirubin 1.8 (H) 0.3 - 1.2 mg/dL    eGFR Non African Amer 79 >60 mL/min/1.73    Globulin 2.8 gm/dL    A/G Ratio 1.4 (L) 1.5 - 2.5 g/dL    BUN/Creatinine Ratio 20.0 7.0 - 25.0    Anion Gap 11.0 3.0 - 11.0 mmol/L   Protime-INR    Collection Time: 12/08/18  2:53 AM   Result Value Ref Range    Protime 13.8 (H) 9.6 - 11.5 Seconds    INR 1.31 (H) 0.91 - 1.09   Blood Gas, Arterial With Co-Ox    Collection Time: 12/08/18  3:19 AM   Result Value Ref Range    Site Right Radial     Basilio's Test Positive     pH, Arterial 7.431 7.350 - 7.450 pH units    pCO2, Arterial 36.1 mm Hg    pO2, Arterial 132.0 (H) 83.0 - 108.0 mm Hg    HCO3, Arterial 24.0 20.0 - 26.0 mmol/L    Base Excess, Arterial -0.1 (L) 0.0 - 2.0 mmol/L    Hemoglobin, Blood Gas 11.2 (L) 13.5 - 17.5 g/dL    Hematocrit, Blood Gas 34.3 %    Oxyhemoglobin 97.4 94 - 99 %    Methemoglobin 0.70 0.00 - 1.50 %    Carboxyhemoglobin 1.4 0 - 2 %    Temperature 37.0 C    Barometric Pressure for Blood Gas  mmHg    Modality Ventilator     FIO2 40 %    Set Tidal Volume 520     Set Mech Resp Rate 14     Rate 14 Breaths/minute    PEEP 5.0     Note      pH, Temp Corrected 7.431 pH Units    pCO2, Temperature Corrected 36.1 35 - 48 mm Hg    pO2, Temperature Corrected 132 (H) 83 - 108 mm Hg     Note: In addition to lab results from this visit, the labs listed above may include labs taken at another facility or during a different encounter within the last 24 hours. Please correlate lab times with ED admission and discharge times for further clarification of the services performed during this visit.    CT Head Without Contrast   Final Result   1. Extensive diffuse right convexity acute subdural hematoma increased in size    compared to prior study now measuring up to 32 mm in thickness.    2. Right hemispheric sulcal effacement, subfalcine herniation and 24 mm of     right-to-left midline shift.    3. Stable small approximately 1.2 cc anterior inferior left frontal lobe    hemorrhagic contusion and mild acute subarachnoid hemorrhage.    4. Diffuse atrophy and white matter chronic small vessel ischemic changes.    ASPECTS:10.       THIS REPORT CONTAINS FINDINGS THAT MAY BE CRITICAL TO PATIENT CARE. The    findings were verbally communicated via telephone conference with DARWIN Catalan at 2:44 AM EST on 12/8/2018. The findings were acknowledged and    understood.      THIS DOCUMENT HAS BEEN ELECTRONICALLY SIGNED BY JIMBO WELSH JR. MD      XR Chest 1 View   Final Result   Minimal right basilar atelectasis versus pneumonia.       THIS DOCUMENT HAS BEEN ELECTRONICALLY SIGNED BY JIMBO WELSH JR. MD      CT Facial Bones Without Contrast   Final Result   No acute fracture demonstrated.       THIS DOCUMENT HAS BEEN ELECTRONICALLY SIGNED BY JIMBO WELSH JR. MD      CT Cervical Spine Without Contrast   Final Result   No evidence of acute fracture or subluxation.       THIS DOCUMENT HAS BEEN ELECTRONICALLY SIGNED BY JIMBO WELSH JR. MD      CT Head Without Contrast   Final Result   1. Diffuse right convexity acute subdural hematoma measuring up to 14 mm in    thickness.    2. 1.5 x 1.1 x 1.5 cm, approximately 1.2 cc, anterior inferior left frontal    lobe hemorrhagic contusion and minimal subarachnoid hemorrhage.    3. Right hemispheric sulcal effacement and 8 mm of right-to-left midline shift.    4. Diffuse atrophy and white matter chronic small vessel ischemic changes.       THIS REPORT CONTAINS FINDINGS THAT MAY BE CRITICAL TO PATIENT CARE. The    findings were verbally communicated via telephone conference with DARWIN Catalan at 1:12 AM EST on 12/8/2018. The findings were acknowledged and    understood.      THIS DOCUMENT HAS BEEN ELECTRONICALLY SIGNED BY JIMBO WELSH JR. MD      XR Chest 1 View    (Results Pending)     Vitals:    12/08/18 0345 12/08/18 0350 12/08/18 0400 12/08/18  0427   BP: 105/55  107/50    BP Location:   Left arm    Patient Position:   Lying    Pulse: 105 92 96 (!) 0   Resp:       Temp:       TempSrc:       SpO2: 97% 97% 97% (!) 22%   Weight:       Height:         Medications   LORazepam (ATIVAN) injection 1 mg (not administered)   chlorhexidine (PERIDEX) 0.12 % solution 15 mL (not administered)   Morphine sulfate PCA 1 mg/mL 30 mL syringe (2 mg/hr Intravenous Not Given 12/8/18 0434)   promethazine (PHENERGAN) injection 12.5 mg (12.5 mg Intravenous Given 12/8/18 0131)   prothrombin complex conc human (KCentra) IV solution 2,000 Units (2,000 Units Intravenous Given 12/8/18 0206)     And   phytonadione (AQUA-MEPHYTON, VITAMIN K) 10 mg in sodium chloride 0.9 % 50 mL IVPB (10 mg Intravenous New Bag 12/8/18 0218)   etomidate (AMIDATE) injection 19.86 mg (20 mg Intravenous Given 12/8/18 0159)   rocuronium (ZEMURON) injection 100 mg (100 mg Intravenous Given 12/8/18 0159)     ECG/EMG Results (last 24 hours)     Procedure Component Value Units Date/Time    ECG 12 Lead [[id number]] Collected:  12/08/18 0134     Updated:  12/08/18 0133                  ICH Score (for Intracerebral Hemorrhage) reviewed and/or performed as part of the patient evaluation and treatment planning process.  The result associated with this review/performance is: 1   ICH score documented at 0115 12/8/18      ICH Score (for Intracerebral Hemorrhage) reviewed and/or performed as part of the patient evaluation and treatment planning process.  The result associated with this review/performance is: 2   ICH score documented at 0205 12/8/18    MDM  Number of Diagnoses or Management Options  Critical Care  Total time providing critical care:  minutes      Final diagnoses:   SDH (subdural hematoma) (CMS/HCC)   SAH (subarachnoid hemorrhage) (CMS/HCC)   Contusion of right cerebral hemisphere, without loss of consciousness, initial encounter (CMS/HCC)   Periorbital contusion of left eye, initial encounter   Brain  herniation (CMS/[facility])   Blaine coma scale total score 13-15, at arrival to emergency department       Documentation assistance provided by virginie Carvajal.  Information recorded by the scribe was done at my direction and has been verified and validated by me.     Edward Carvajal  12/08/18 0107       Alena, Edward Anderson  12/08/18 0116       Alena, Edward Anderson  12/08/18 0127       Alena, Edward Anderson  12/08/18 0208       Alena, Edward Anderson  12/08/18 0218       Alena, Edward Anderson  12/08/18 0246       Matt Wolff MD  12/08/18 7357